# Patient Record
Sex: MALE | Race: WHITE | NOT HISPANIC OR LATINO | URBAN - METROPOLITAN AREA
[De-identification: names, ages, dates, MRNs, and addresses within clinical notes are randomized per-mention and may not be internally consistent; named-entity substitution may affect disease eponyms.]

---

## 2019-11-02 ENCOUNTER — INPATIENT (INPATIENT)
Facility: HOSPITAL | Age: 69
LOS: 1 days | Discharge: ANOTHER HOSPITAL | DRG: 303 | End: 2019-11-04
Attending: INTERNAL MEDICINE | Admitting: FAMILY MEDICINE
Payer: MEDICARE

## 2019-11-02 VITALS
RESPIRATION RATE: 20 BRPM | DIASTOLIC BLOOD PRESSURE: 121 MMHG | SYSTOLIC BLOOD PRESSURE: 218 MMHG | WEIGHT: 167.99 LBS | HEART RATE: 101 BPM | OXYGEN SATURATION: 97 % | TEMPERATURE: 98 F

## 2019-11-02 DIAGNOSIS — Z98.890 OTHER SPECIFIED POSTPROCEDURAL STATES: Chronic | ICD-10-CM

## 2019-11-02 LAB
APTT BLD: 28.6 SEC — SIGNIFICANT CHANGE UP (ref 28.5–37)
BASOPHILS # BLD AUTO: 0.02 K/UL — SIGNIFICANT CHANGE UP (ref 0–0.2)
BASOPHILS NFR BLD AUTO: 0.3 % — SIGNIFICANT CHANGE UP (ref 0–2)
CK SERPL-CCNC: 502 U/L — HIGH (ref 26–308)
EOSINOPHIL # BLD AUTO: 0.19 K/UL — SIGNIFICANT CHANGE UP (ref 0–0.5)
EOSINOPHIL NFR BLD AUTO: 2.5 % — SIGNIFICANT CHANGE UP (ref 0–6)
HCT VFR BLD CALC: 46.6 % — SIGNIFICANT CHANGE UP (ref 39–50)
HGB BLD-MCNC: 15.6 G/DL — SIGNIFICANT CHANGE UP (ref 13–17)
IMM GRANULOCYTES NFR BLD AUTO: 0.4 % — SIGNIFICANT CHANGE UP (ref 0–1.5)
INR BLD: 0.97 RATIO — SIGNIFICANT CHANGE UP (ref 0.88–1.16)
LYMPHOCYTES # BLD AUTO: 1.91 K/UL — SIGNIFICANT CHANGE UP (ref 1–3.3)
LYMPHOCYTES # BLD AUTO: 25.6 % — SIGNIFICANT CHANGE UP (ref 13–44)
MCHC RBC-ENTMCNC: 30.3 PG — SIGNIFICANT CHANGE UP (ref 27–34)
MCHC RBC-ENTMCNC: 33.5 GM/DL — SIGNIFICANT CHANGE UP (ref 32–36)
MCV RBC AUTO: 90.5 FL — SIGNIFICANT CHANGE UP (ref 80–100)
MONOCYTES # BLD AUTO: 0.61 K/UL — SIGNIFICANT CHANGE UP (ref 0–0.9)
MONOCYTES NFR BLD AUTO: 8.2 % — SIGNIFICANT CHANGE UP (ref 2–14)
NEUTROPHILS # BLD AUTO: 4.71 K/UL — SIGNIFICANT CHANGE UP (ref 1.8–7.4)
NEUTROPHILS NFR BLD AUTO: 63 % — SIGNIFICANT CHANGE UP (ref 43–77)
NRBC # BLD: 0 /100 WBCS — SIGNIFICANT CHANGE UP (ref 0–0)
PLATELET # BLD AUTO: 212 K/UL — SIGNIFICANT CHANGE UP (ref 150–400)
PROTHROM AB SERPL-ACNC: 10.9 SEC — SIGNIFICANT CHANGE UP (ref 10–12.9)
RBC # BLD: 5.15 M/UL — SIGNIFICANT CHANGE UP (ref 4.2–5.8)
RBC # FLD: 12.6 % — SIGNIFICANT CHANGE UP (ref 10.3–14.5)
TROPONIN I SERPL-MCNC: <.015 NG/ML — SIGNIFICANT CHANGE UP (ref 0.01–0.04)
WBC # BLD: 7.47 K/UL — SIGNIFICANT CHANGE UP (ref 3.8–10.5)
WBC # FLD AUTO: 7.47 K/UL — SIGNIFICANT CHANGE UP (ref 3.8–10.5)

## 2019-11-02 PROCEDURE — 71275 CT ANGIOGRAPHY CHEST: CPT | Mod: 26

## 2019-11-02 PROCEDURE — 93010 ELECTROCARDIOGRAM REPORT: CPT

## 2019-11-02 PROCEDURE — 71046 X-RAY EXAM CHEST 2 VIEWS: CPT | Mod: 26

## 2019-11-02 RX ORDER — SODIUM CHLORIDE 9 MG/ML
1000 INJECTION INTRAMUSCULAR; INTRAVENOUS; SUBCUTANEOUS ONCE
Refills: 0 | Status: COMPLETED | OUTPATIENT
Start: 2019-11-02 | End: 2019-11-02

## 2019-11-02 RX ADMIN — Medication 1 MILLIGRAM(S): at 23:50

## 2019-11-02 RX ADMIN — SODIUM CHLORIDE 1000 MILLILITER(S): 9 INJECTION INTRAMUSCULAR; INTRAVENOUS; SUBCUTANEOUS at 21:21

## 2019-11-02 NOTE — ED PROVIDER NOTE - OBJECTIVE STATEMENT
pt bib ems for palp and discomfort to chest tonight. pt reports symptoms now improved. no fevers, chills, ha, d/n/v, sob, cough, abd pain, edema, calf pain, travel.  pmd - joann muniz - jolantar

## 2019-11-02 NOTE — ED ADULT NURSE NOTE - OBJECTIVE STATEMENT
Patient alert and oriented X 3. Complaining of intermittent palpitations that started 45 minutes ago. No palpitations at this time. Denies chest pain, shortness of breath, nausea, vomiting, headache, dizziness and fever. Lungs clears bilaterally. Respirations even and not labored. Abdomen soft non tender.

## 2019-11-02 NOTE — ED ADULT NURSE NOTE - CHPI ED NUR SYMPTOMS NEG
no chest pain/no fever/no congestion/no syncope/no vomiting/no shortness of breath/no nausea/no back pain/no chills/no dizziness/no diaphoresis

## 2019-11-03 DIAGNOSIS — I10 ESSENTIAL (PRIMARY) HYPERTENSION: ICD-10-CM

## 2019-11-03 DIAGNOSIS — Z29.9 ENCOUNTER FOR PROPHYLACTIC MEASURES, UNSPECIFIED: ICD-10-CM

## 2019-11-03 DIAGNOSIS — I20.0 UNSTABLE ANGINA: ICD-10-CM

## 2019-11-03 DIAGNOSIS — E78.5 HYPERLIPIDEMIA, UNSPECIFIED: ICD-10-CM

## 2019-11-03 DIAGNOSIS — R07.9 CHEST PAIN, UNSPECIFIED: ICD-10-CM

## 2019-11-03 LAB
CK MB BLD-MCNC: 2 % — SIGNIFICANT CHANGE UP (ref 0–3.5)
CK MB CFR SERPL CALC: 5.5 NG/ML — HIGH (ref 0–3.6)
CK SERPL-CCNC: 270 U/L — SIGNIFICANT CHANGE UP (ref 26–308)
TROPONIN I SERPL-MCNC: 0.04 NG/ML — SIGNIFICANT CHANGE UP (ref 0.01–0.04)
TROPONIN I SERPL-MCNC: <.015 NG/ML — SIGNIFICANT CHANGE UP (ref 0.01–0.04)

## 2019-11-03 PROCEDURE — 99223 1ST HOSP IP/OBS HIGH 75: CPT

## 2019-11-03 PROCEDURE — 99221 1ST HOSP IP/OBS SF/LOW 40: CPT | Mod: GC,AI

## 2019-11-03 PROCEDURE — 99285 EMERGENCY DEPT VISIT HI MDM: CPT

## 2019-11-03 RX ORDER — ATORVASTATIN CALCIUM 80 MG/1
40 TABLET, FILM COATED ORAL AT BEDTIME
Refills: 0 | Status: DISCONTINUED | OUTPATIENT
Start: 2019-11-03 | End: 2019-11-04

## 2019-11-03 RX ORDER — LISINOPRIL 2.5 MG/1
10 TABLET ORAL DAILY
Refills: 0 | Status: DISCONTINUED | OUTPATIENT
Start: 2019-11-03 | End: 2019-11-04

## 2019-11-03 RX ORDER — ATORVASTATIN CALCIUM 80 MG/1
40 TABLET, FILM COATED ORAL DAILY
Refills: 0 | Status: DISCONTINUED | OUTPATIENT
Start: 2019-11-03 | End: 2019-11-03

## 2019-11-03 RX ORDER — ASPIRIN/CALCIUM CARB/MAGNESIUM 324 MG
325 TABLET ORAL DAILY
Refills: 0 | Status: DISCONTINUED | OUTPATIENT
Start: 2019-11-03 | End: 2019-11-04

## 2019-11-03 RX ORDER — ENOXAPARIN SODIUM 100 MG/ML
80 INJECTION SUBCUTANEOUS
Refills: 0 | Status: DISCONTINUED | OUTPATIENT
Start: 2019-11-03 | End: 2019-11-04

## 2019-11-03 RX ORDER — CLOPIDOGREL BISULFATE 75 MG/1
75 TABLET, FILM COATED ORAL DAILY
Refills: 0 | Status: DISCONTINUED | OUTPATIENT
Start: 2019-11-03 | End: 2019-11-04

## 2019-11-03 RX ORDER — CHOLECALCIFEROL (VITAMIN D3) 125 MCG
1000 CAPSULE ORAL DAILY
Refills: 0 | Status: DISCONTINUED | OUTPATIENT
Start: 2019-11-03 | End: 2019-11-04

## 2019-11-03 RX ADMIN — Medication 1000 UNIT(S): at 11:14

## 2019-11-03 RX ADMIN — ATORVASTATIN CALCIUM 40 MILLIGRAM(S): 80 TABLET, FILM COATED ORAL at 21:42

## 2019-11-03 RX ADMIN — CLOPIDOGREL BISULFATE 75 MILLIGRAM(S): 75 TABLET, FILM COATED ORAL at 11:14

## 2019-11-03 RX ADMIN — ENOXAPARIN SODIUM 80 MILLIGRAM(S): 100 INJECTION SUBCUTANEOUS at 18:44

## 2019-11-03 RX ADMIN — Medication 325 MILLIGRAM(S): at 11:14

## 2019-11-03 NOTE — ED ADULT NURSE REASSESSMENT NOTE - COMFORT CARE
po fluids offered/repositioned/wait time explained/side rails up/warm blanket provided/meal provided
plan of care explained

## 2019-11-03 NOTE — H&P ADULT - NSHPPHYSICALEXAM_GEN_ALL_CORE
Physical Exam:  General: NAD  Neck: Some neck discomfort when turning neck side to side.  HEENT: NCAT, PERRLA, EOMI bl, moist mucous membranes   Neurology: A&Ox3, nonfocal, CN II-XII grossly intact, sensation intact   Respiratory: CTA B/L, No W/R/R  CV: RRR, +S1/S2, no murmurs, rubs or gallops  Abdominal: Soft, NT, ND +BSx4, no palpable masses  Extremities: No C/C/E, + peripheral pulses  MSK: Normal ROM, no joint erythema or warmth, no joint swelling   Heme: No palpable supraclavicular nodules, no obvious ecchymosis or petechiae   Skin: warm, dry, normal color

## 2019-11-03 NOTE — CONSULT NOTE ADULT - SUBJECTIVE AND OBJECTIVE BOX
[facility]         Adriana Be, Heidi, Inga, Keegan, Rubin, Aracelis        [phone] (office)    CHIEF COMPLAINT: Patient is a 69y old  Male who presents with a chief complaint of palpitations    HPI:  69m htn, hyperlipidemia, known cad s/p mi and pci in 2005.  Is seen by Dr. Moreno in [place]. Has been doing well from a cv perspective, without significant sxs of angina, heart failure or arrhythmia.  For several months he has been experiencing discomfort in his throat provoked by walking or other activity, more so in colder weather.  The sxs may be prolonged and last >30 minutes.  They have not occurred at rest.  They have not been associated with discomfort in any other location or dyspnea.    Last night he noted the onset of palpitations at rest associated with this throat discomfort.  Symptoms lasted >30 min.  He was still having the sxs in the er, at which time his heart rate was 100.  His ekg was not acutely ischemic.  His first set of ce was normal with an increase in the troponin for the second set, which remains in the normal range.  A cta was performed given sxs and elevated ddimer which was negative for pe.  coronary calcification was noted.  Consultation is now being obtained.    PAST MEDICAL & SURGICAL HISTORY:  Stented coronary artery  H/O hernia repair      SOCIAL HISTORY: No active tobacco, alcohol or illicit drug use    FAMILY HISTORY:   No pertinent family history of CAD    Outpatient medications: asa 81, plavix 75, atorava, quinapril    MEDICATIONS  (STANDING):    MEDICATIONS  (PRN):      Allergies    No Known Allergies    Intolerances        REVIEW OF SYSTEMS: Is negative for eye, ENT, GI, , allergic, dermatologic, musculoskeletal and neurologic, except as described above.    VITAL SIGNS:   Vital Signs Last 24 Hrs  T(C): 36.7 (03 Nov 2019 03:17), Max: 36.9 (02 Nov 2019 22:10)  T(F): 98 (03 Nov 2019 03:17), Max: 98.4 (02 Nov 2019 22:10)  HR: 50 (03 Nov 2019 03:17) (50 - 101)  BP: 155/73 (03 Nov 2019 03:17) (155/73 - 218/121)  BP(mean): --  RR: 14 (03 Nov 2019 03:17) (14 - 20)  SpO2: 96% (03 Nov 2019 03:17) (95% - 97%)    I&O's Summary      PHYSICAL EXAM:    Constitutional: NAD, awake and alert, well-developed  Eyes:  EOMI, no oral cyanosis, conjunctivae clear, anicteric.  Pulmonary: Non-labored, breath sounds are clear bilaterally, no wheezing, rales or rhonchi  Cardiovascular:  regular S1 and S2. No murmur.  No rubs, gallops or clicks  Gastrointestinal: Bowel Sounds present, soft, nontender.   Lymph: No peripheral edema.   Neurological: Alert, strength and sensitivity are grossly intact  Skin: No obvious lesions/rashes.   Psych:  Mood & affect appropriate .    LABS: All Labs Reviewed:                        15.6   7.47  )-----------( 212      ( 02 Nov 2019 22:37 )             46.6     02 Nov 2019 22:37    144    |  110    |  21     ----------------------------<  141    3.5     |  26     |  0.99     Ca    9.2        02 Nov 2019 22:37    TPro  7.4    /  Alb  4.2    /  TBili  0.4    /  DBili  x      /  AST  28     /  ALT  32     /  AlkPhos  112    02 Nov 2019 22:37    PT/INR - ( 02 Nov 2019 22:37 )   PT: 10.9 sec;   INR: 0.97 ratio         PTT - ( 02 Nov 2019 22:37 )  PTT:28.6 sec  CARDIAC MARKERS ( 03 Nov 2019 05:45 )  .036 ng/mL / x     / x     / x     / x      CARDIAC MARKERS ( 02 Nov 2019 22:37 )  <.015 ng/mL / x     / 502 U/L / x     / x          Blood Culture:         RADIOLOGY:  < from: CT Angio Chest w/ IV Cont (11.02.19 @ 23:38) >    EXAM:  CT ANGIO CHEST (W)AW IC                            PROCEDURE DATE:  11/02/2019          INTERPRETATION:  CLINICAL INFORMATION: Palpitations, tachycardia, chest   discomfort, elevated d-dimer.    COMPARISON: None.    PROCEDURE:   CT Angiography of the Chest.  80 ml of Omnipaque 350 was injected intravenously. 20 ml were discarded.  Sagittal and coronal reformats were performed as well as 3D (MIP)   reconstructions.      FINDINGS:    LUNGS AND AIRWAYS: Patent central airways. 4 mm nodule in the right   middle lobe (series 4:261). No suspicious lung consolidation, nodule, or   mass. Mild centrilobular emphysema. Mild bibasilar dependent atelectasis.    PLEURA: No pleural effusion.    MEDIASTINUM AND ABDELRAHMAN: No lymphadenopathy.    VESSELS:Adequate pulmonary arterial opacification. No pulmonary   embolism. Normal caliber thoracic aorta and main pulmonary artery.   Atherosclerotic calcifications of the thoracic aorta and coronary   arteries.    HEART: Heart size is normal. No pericardial effusion.    CHEST WALL AND LOWER NECK: Within normal limits.    VISUALIZED UPPER ABDOMEN: Cholelithiasis. Subcentimeter low-attenuation   lesion in the right kidney that is too small to characterize.    BONES: Mild degenerative changes of the spine.    IMPRESSION:     No pulmonary embolism. No acute parenchymal or pleural lung disease.                JADA OLIVIA D.O. ATTENDING RADIOLOGIST  This document has been electronically signed. Nov  3 2019 12:35AM                < end of copied text >    EKG:

## 2019-11-03 NOTE — CONSULT NOTE ADULT - ASSESSMENT
69m htn, hyperlipidemia, known cad s/p mi and pci in 2005.  Is seen by Dr. Shepard in [place]. Has been doing well from a cv perspective, without significant sxs of angina, heart failure or arrhythmia.  For several months he has been experiencing discomfort in his throat provoked by walking or other activity, more so in colder weather.  The sxs may be prolonged and last >30 minutes.  They have not occurred at rest.  They have not been associated with discomfort in any other location or dyspnea.    Last night he noted the onset of palpitations at rest associated with this throat discomfort.  Symptoms lasted >30 min.  He was still having the sxs in the er, at which time his heart rate was 100.  His ekg was not acutely ischemic.  His first set of ce was normal with an increase in the troponin for the second set, which remains in the normal range.  A cta was performed given sxs and elevated ddimer which was negative for pe.  coronary calcification was noted.  Consultation is now being obtained.    -known cad s/p remote pci  -has been on dapt and statin  -his hx is consistent with 3-4 mos of angina (throat discomfort) with activity, leida in colder weather, a classic description  -sxs last night in the setting of palpitations  -whether sxs provoked by tachycardia or not, his sxs of angina with activity, and last night at rest is concerning  -would cont dapt and statin and add lovenox, and plan to tx for cath tomorrow. sees dr shepard who reportedly goes to [facility]. we will contact him tomorrow    -palpitations of uncertain etiology  -with ongoing sxs in er he had a sinus tach at 100  -unclear if the sx was from a significant arrhythmia or not  -tele    -no vol ol

## 2019-11-03 NOTE — H&P ADULT - HISTORY OF PRESENT ILLNESS
ED vitals: afebrile, BP: 218/121 then 180/51, HR: 101 then 50, 96 on RA  Labs significant for: Ddimer: 241, CK: 502    CT angio chest: No pulmonary embolism. No acute parenchymal or pleural lung disease   CXR: No active disease    EKG:   In the ED patient was given NS IVF bolus X1, ativan 1 mg X1 68 y/o M with a PMHx of HTN, HLD, CAD s/p MI and PCI in 2005 presented to the ED c/o palpitations' with associated throat discomfort. Yesterday patient was cleaning his yard for 5 hours total, when after he developed palpitations at rest, and new onset throat discomfort that did not go away. Symptoms lessened within an hour. Patient has been experiencing chest discomfort with throat pain for the last few months when doing activity, but they did not last as long as last nights episode. Denies current chest pain, N/V/D, SOB, abdominal pain    ED vitals: afebrile, BP: 218/121 then 180/51, HR: 101 then 50, 96 on RA  Labs significant for: Ddimer: 241, CK: 502    CT angio chest: No pulmonary embolism. No acute parenchymal or pleural lung disease   CXR: No active disease    EKG: NSR, 100 bpm  In the ED patient was given NS IVF bolus X1, ativan 1 mg X1

## 2019-11-03 NOTE — H&P ADULT - NSHPREVIEWOFSYSTEMS_GEN_ALL_CORE
Constitutional: denies fever, chills  HEENT: denies blurry vision, difficulty hearing  Respiratory: denies SOB, JETER, cough, sputum production, wheezing  Cardiovascular: Admits to chest discomfort and palpitations last night with activity, denies current chest discomfort and palpitations, LE edema  Gastrointestinal: denies nausea, vomiting, diarrhea, constipation, abdominal pain, melena, hematochezia   Genitourinary: denies dysuria, frequency, urgency, hematuria   Skin: Denies rashes, itching  Musculoskeletal: denies myalgias, joint swelling, muscle weakness  Neurologic: denies headache, weakness, dizziness, numbness/tingling  Psych: Denies changes in mood  ROS negative except as noted above

## 2019-11-03 NOTE — ED ADULT NURSE REASSESSMENT NOTE - NS ED NURSE REASSESS COMMENT FT1
Patient returned from CT complaining of chills, and pain to left side of throat. Dr. Benítez made aware and at the bedside. No rash, hives, difficulty breathing at this time. Patient medicated per order. Repeat EKG completed. Ahsan LUTZ
Patient states " I fell better now. " after receiving 1 mg ativan. Respirations even and not labored. NSR noted on cardiac monitor. Dr. Benítez made aware. Patient denies throat pain a this time. Pending CT result. Soraya LUTZ

## 2019-11-03 NOTE — H&P ADULT - PROBLEM SELECTOR PLAN 4
BB IMPROVE VTE Individual Risk Assessment          RISK                                                          Points    [  ] Previous VTE                                                3  [  ] Thrombophilia                                             2  [  ] Lower limb paralysis                                   2        (unable to hold up >15 seconds)    [  ] Current Cancer                                            2         (within 6 months)  [  ] Immobilization > 24 hrs                              1  [  ] ICU/CCU stay > 24 hours                            1  [  ] Age > 60                                                    1    IMPROVE VTE Score _________ Full dose Lovenox    IMPROVE VTE Individual Risk Assessment          RISK                                                          Points    [  ] Previous VTE                                                3  [  ] Thrombophilia                                             2  [  ] Lower limb paralysis                                   2        (unable to hold up >15 seconds)    [  ] Current Cancer                                            2         (within 6 months)  [  ] Immobilization > 24 hrs                              1  [  ] ICU/CCU stay > 24 hours                            1  [ x ] Age > 60                                                    1    IMPROVE VTE Score 1

## 2019-11-03 NOTE — H&P ADULT - ASSESSMENT
INCOMPLETE 70 y/o M with a PMHx of HTN, HLD, CAD s/p MI and PCI in 2005 presented to the ED c/o palpitations' with associated throat discomfort.

## 2019-11-03 NOTE — H&P ADULT - PROBLEM SELECTOR PLAN 1
-first troponin wnl, increase in second troponin but within normal limits, follow third set of CE   -Continue home statin, ASA, Plavix   -  -Ddimer elevated, CTA done in the ED negative for PE  -Cardio consult Dr. Xiong evaluated patient in the ED, recs appreciated Known CAD with PCI history   - Several months of angina with associated throat discomfort and palpitations.  - Now with a angina after activity that lasted >30 minutes.  - first troponin wnl, increase in second troponin but within normal limits, follow third set of CE   -Continue home statin, ASA, Plavix   -Ddimer elevated, CTA done in the ED negative for PE  -Cardio consult Dr. Xiong evaluated patient in the ED, recs appreciated Known CAD with PCI history   - Several months of angina with associated throat discomfort and palpitations.  - Now with a angina after activity that lasted >30 minutes.  - first troponin wnl, increase in second troponin but within normal limits, follow third set of CE   -Continue home statin, ASA, Plavix   -Ddimer elevated, CTA done in the ED negative for PE  -Cardio consult Dr. Xiong evaluated patient in the ED, recs appreciated  - Transfer to [place] for cardiac catheter

## 2019-11-04 ENCOUNTER — TRANSCRIPTION ENCOUNTER (OUTPATIENT)
Age: 69
End: 2019-11-04

## 2019-11-04 VITALS — HEART RATE: 58 BPM

## 2019-11-04 LAB
ANION GAP SERPL CALC-SCNC: 6 MMOL/L — SIGNIFICANT CHANGE UP (ref 5–17)
BUN SERPL-MCNC: 17 MG/DL — SIGNIFICANT CHANGE UP (ref 7–23)
CALCIUM SERPL-MCNC: 8.5 MG/DL — SIGNIFICANT CHANGE UP (ref 8.5–10.1)
CHLORIDE SERPL-SCNC: 109 MMOL/L — HIGH (ref 96–108)
CK MB BLD-MCNC: 1.8 % — SIGNIFICANT CHANGE UP (ref 0–3.5)
CK MB CFR SERPL CALC: 3.2 NG/ML — SIGNIFICANT CHANGE UP (ref 0–3.6)
CK SERPL-CCNC: 179 U/L — SIGNIFICANT CHANGE UP (ref 26–308)
CO2 SERPL-SCNC: 27 MMOL/L — SIGNIFICANT CHANGE UP (ref 22–31)
CREAT SERPL-MCNC: 0.85 MG/DL — SIGNIFICANT CHANGE UP (ref 0.5–1.3)
GLUCOSE SERPL-MCNC: 116 MG/DL — HIGH (ref 70–99)
HCT VFR BLD CALC: 47.1 % — SIGNIFICANT CHANGE UP (ref 39–50)
HCV AB S/CO SERPL IA: 0.16 S/CO — SIGNIFICANT CHANGE UP (ref 0–0.99)
HCV AB SERPL-IMP: SIGNIFICANT CHANGE UP
HGB BLD-MCNC: 15.5 G/DL — SIGNIFICANT CHANGE UP (ref 13–17)
MCHC RBC-ENTMCNC: 29.9 PG — SIGNIFICANT CHANGE UP (ref 27–34)
MCHC RBC-ENTMCNC: 32.9 GM/DL — SIGNIFICANT CHANGE UP (ref 32–36)
MCV RBC AUTO: 90.8 FL — SIGNIFICANT CHANGE UP (ref 80–100)
NRBC # BLD: 0 /100 WBCS — SIGNIFICANT CHANGE UP (ref 0–0)
PLATELET # BLD AUTO: 202 K/UL — SIGNIFICANT CHANGE UP (ref 150–400)
POTASSIUM SERPL-MCNC: 4.2 MMOL/L — SIGNIFICANT CHANGE UP (ref 3.5–5.3)
POTASSIUM SERPL-SCNC: 4.2 MMOL/L — SIGNIFICANT CHANGE UP (ref 3.5–5.3)
RBC # BLD: 5.19 M/UL — SIGNIFICANT CHANGE UP (ref 4.2–5.8)
RBC # FLD: 12.7 % — SIGNIFICANT CHANGE UP (ref 10.3–14.5)
SODIUM SERPL-SCNC: 142 MMOL/L — SIGNIFICANT CHANGE UP (ref 135–145)
TROPONIN I SERPL-MCNC: <.015 NG/ML — SIGNIFICANT CHANGE UP (ref 0.01–0.04)
WBC # BLD: 6.05 K/UL — SIGNIFICANT CHANGE UP (ref 3.8–10.5)
WBC # FLD AUTO: 6.05 K/UL — SIGNIFICANT CHANGE UP (ref 3.8–10.5)

## 2019-11-04 PROCEDURE — 99239 HOSP IP/OBS DSCHRG MGMT >30: CPT

## 2019-11-04 PROCEDURE — 99233 SBSQ HOSP IP/OBS HIGH 50: CPT

## 2019-11-04 PROCEDURE — 93010 ELECTROCARDIOGRAM REPORT: CPT

## 2019-11-04 RX ORDER — ASPIRIN/CALCIUM CARB/MAGNESIUM 324 MG
1 TABLET ORAL
Qty: 0 | Refills: 0 | DISCHARGE
Start: 2019-11-04

## 2019-11-04 RX ORDER — CHOLECALCIFEROL (VITAMIN D3) 125 MCG
1 CAPSULE ORAL
Qty: 0 | Refills: 0 | DISCHARGE

## 2019-11-04 RX ORDER — ENOXAPARIN SODIUM 100 MG/ML
80 INJECTION SUBCUTANEOUS
Qty: 0 | Refills: 0 | DISCHARGE
Start: 2019-11-04

## 2019-11-04 RX ORDER — ATORVASTATIN CALCIUM 80 MG/1
0 TABLET, FILM COATED ORAL
Qty: 0 | Refills: 0 | DISCHARGE

## 2019-11-04 RX ORDER — ASPIRIN/CALCIUM CARB/MAGNESIUM 324 MG
0 TABLET ORAL
Qty: 0 | Refills: 0 | DISCHARGE

## 2019-11-04 RX ORDER — QUINAPRIL HYDROCHLORIDE 40 MG/1
0 TABLET, FILM COATED ORAL
Qty: 0 | Refills: 0 | DISCHARGE

## 2019-11-04 RX ORDER — ATORVASTATIN CALCIUM 80 MG/1
1 TABLET, FILM COATED ORAL
Qty: 0 | Refills: 0 | DISCHARGE

## 2019-11-04 RX ORDER — ASPIRIN/CALCIUM CARB/MAGNESIUM 324 MG
1 TABLET ORAL
Qty: 0 | Refills: 0 | DISCHARGE

## 2019-11-04 RX ORDER — QUINAPRIL HYDROCHLORIDE 40 MG/1
1 TABLET, FILM COATED ORAL
Qty: 0 | Refills: 0 | DISCHARGE

## 2019-11-04 RX ORDER — CLOPIDOGREL BISULFATE 75 MG/1
0 TABLET, FILM COATED ORAL
Qty: 0 | Refills: 0 | DISCHARGE

## 2019-11-04 RX ORDER — CLOPIDOGREL BISULFATE 75 MG/1
1 TABLET, FILM COATED ORAL
Qty: 0 | Refills: 0 | DISCHARGE

## 2019-11-04 RX ORDER — ACETAMINOPHEN 500 MG
650 TABLET ORAL ONCE
Refills: 0 | Status: COMPLETED | OUTPATIENT
Start: 2019-11-04 | End: 2019-11-04

## 2019-11-04 RX ADMIN — CLOPIDOGREL BISULFATE 75 MILLIGRAM(S): 75 TABLET, FILM COATED ORAL at 11:39

## 2019-11-04 RX ADMIN — Medication 1000 UNIT(S): at 11:39

## 2019-11-04 RX ADMIN — Medication 650 MILLIGRAM(S): at 06:45

## 2019-11-04 RX ADMIN — Medication 325 MILLIGRAM(S): at 11:39

## 2019-11-04 RX ADMIN — Medication 650 MILLIGRAM(S): at 06:05

## 2019-11-04 RX ADMIN — ENOXAPARIN SODIUM 80 MILLIGRAM(S): 100 INJECTION SUBCUTANEOUS at 05:18

## 2019-11-04 RX ADMIN — LISINOPRIL 10 MILLIGRAM(S): 2.5 TABLET ORAL at 05:18

## 2019-11-04 NOTE — PROGRESS NOTE ADULT - ATTENDING COMMENTS
I saw and examined the patient personally. Spoke with above provider regarding this case. I reviewed the above findings completely.  I agree with the above history, physical, and plan which I have edited where appropriate.    I tried to reach Dr. Moreno and left  for callback on his answering service. I spoke with Dr. Prasad at [facility] who will accept pt for left heart cath

## 2019-11-04 NOTE — CHART NOTE - NSCHARTNOTEFT_GEN_A_CORE
RN called resident as patient reporting headache and chest pain. Patient seen and evaluated at bedside by resident. Patient reports "tight" pain diffusely over scalp, describes the pain as mild. Denies any visual or hearing changes. Patient also reporting left-sided non-radiating chest pain that came on suddenly with headache. Will not rate pain on 10-point scale, but describes pain as stabbing and mild. Pain worsened with deep inspiration. States he has never felt chest pain similar to this episode. Patient also reporting feeling cold. Denies shortness of breath, palpitations, N/V, throat pain, or abdominal pain.    T(C): 37 (11-04-19 @ 04:56), Max: 37 (11-04-19 @ 04:56)  HR: 63 (11-04-19 @ 04:56) (50 - 63)  BP: 180/100 (11-04-19 @ 04:56) (145/72 - 180/100)  RR: 16 (11-04-19 @ 04:56) (14 - 18)  SpO2: 96% (11-04-19 @ 04:56) (95% - 98%)    GENERAL: patient in bed, appears to be in mild distress   EYES: sclera clear, no exudates  ENMT: moist mucous membranes  NECK: supple, soft  LUNGS: clear to auscultation, no wheezing or rhonchi appreciated  HEART: S1/S2, regular rate and rhythm, no murmurs noted, no lower extremity edema  GASTROINTESTINAL: abdomen is soft, nontender, nondistended, normoactive bowel sounds  INTEGUMENT: good skin turgor  MUSCULOSKELETAL: no obvious deformity  NEUROLOGIC: awake, alert, oriented x3, good muscle tone in 4 extremities, no obvious sensory deficits, CN II-XII grossly intact    Assessment/Plan: 68 y/o M with a PMHx of HTN, HLD, CAD s/p MI and PCI in 2005 presented to the ED c/o palpitations' with associated throat discomfort most likely 2/2 unstable angina. Now with headache and left-sided chest pain. Plan for patient transfer today for cardiac cath.  - Patient currently in NSR in 60s on telemetry   - Stat EKG: NSR @ 64bpm, appears unchanged from prior EKG on 11/3/19  - F/u stat cardiac enzymes--previous 3 sets wnl   - No signs of volume overload on exam  - Acetaminophen 650mg PO x1 for headache  - BP elevated at 180/100, pt due for lisinopril--RN told to give lisinopril now  - Plan discussed with RN  - Plan discussed with senior resident    Shayy Steele MD, PGY-1 RN called resident as patient reporting headache and chest pain. Patient seen and evaluated at bedside by resident. Patient reports "tight" pain diffusely over scalp, describes the pain as mild. Denies any visual or hearing changes. Patient also reporting left-sided non-radiating chest pain that came on suddenly with headache. Will not rate pain on 10-point scale, but describes pain as stabbing and mild. Pain worsened with deep inspiration. States he has never felt chest pain similar to this episode. Patient also reporting feeling cold. Denies shortness of breath, palpitations, N/V, throat pain, or abdominal pain.    T(C): 37 (11-04-19 @ 04:56), Max: 37 (11-04-19 @ 04:56)  HR: 63 (11-04-19 @ 04:56) (50 - 63)  BP: 180/100 (11-04-19 @ 04:56) (145/72 - 180/100)  RR: 16 (11-04-19 @ 04:56) (14 - 18)  SpO2: 96% (11-04-19 @ 04:56) (95% - 98%)    GENERAL: patient in bed, appears to be in mild distress   EYES: sclera clear, no exudates  ENMT: moist mucous membranes  NECK: supple, soft  LUNGS: clear to auscultation, no wheezing or rhonchi appreciated  HEART: S1/S2, regular rate and rhythm, no murmurs noted, no lower extremity edema  GASTROINTESTINAL: abdomen is soft, nontender, nondistended, normoactive bowel sounds  INTEGUMENT: good skin turgor  MUSCULOSKELETAL: no obvious deformity  NEUROLOGIC: awake, alert, oriented x3, good muscle tone in 4 extremities, no obvious sensory deficits, CN II-XII grossly intact    Assessment/Plan: 70 y/o M with a PMHx of HTN, HLD, CAD s/p MI and PCI in 2005 presented to the ED c/o palpitations' with associated throat discomfort most likely 2/2 unstable angina. Now with headache and left-sided chest pain. Plan for patient transfer today for cardiac cath.  - Patient currently in NSR in 60s on telemetry   - Stat EKG: NSR @ 64bpm, appears unchanged from prior EKG on 11/3/19  - Stat cardiac enzymes wnl  - No signs of volume overload on exam  - Acetaminophen 650mg PO x1 for headache  - BP elevated at 180/100, pt due for lisinopril--RN told to give lisinopril now  - Plan discussed with RN  - Plan discussed with senior resident    Shayy Steele MD, PGY-1

## 2019-11-04 NOTE — PROGRESS NOTE ADULT - SUBJECTIVE AND OBJECTIVE BOX
[facility] -- Adriana Be, Heidi, Inga, Keegan, Aracelis Conklin  Office # [phone]      Follow Up:  chest pain    Subjective/Observations:   Seen at bedside, reports this am 0500 had left sided chest pain radiating to left side neck now resolved,  denies associated symptoms of dizziness diaphoresis nausea  currently with no complaints    REVIEW OF SYSTEMS: All other review of systems is negative unless indicated above    PAST MEDICAL & SURGICAL HISTORY:  Stented coronary artery  H/O hernia repair      MEDICATIONS  (STANDING):  aspirin 325 milliGRAM(s) Oral daily  atorvastatin 40 milliGRAM(s) Oral at bedtime  cholecalciferol 1000 Unit(s) Oral daily  clopidogrel Tablet 75 milliGRAM(s) Oral daily  enoxaparin Injectable 80 milliGRAM(s) SubCutaneous two times a day  lisinopril 10 milliGRAM(s) Oral daily    MEDICATIONS  (PRN):      Allergies    Novocain (Other)    Intolerances        Vital Signs Last 24 Hrs  T(C): 36.8 (04 Nov 2019 07:58), Max: 37 (04 Nov 2019 04:56)  T(F): 98.2 (04 Nov 2019 07:58), Max: 98.6 (04 Nov 2019 04:56)  HR: 53 (04 Nov 2019 07:58) (50 - 63)  BP: 137/81 (04 Nov 2019 07:58) (137/81 - 180/100)  BP(mean): --  RR: 16 (04 Nov 2019 07:58) (15 - 18)  SpO2: 95% (04 Nov 2019 07:58) (95% - 98%)    I&O's Summary        PHYSICAL EXAM:  TELE: sb 50bpm  Constitutional: NAD, awake and alert, well-developed  HEENT: Moist Mucous Membranes, Anicteric  Pulmonary: Non-labored, breath sounds are clear bilaterally, No wheezing, crackles or rhonchi  Cardiovascular: Regular, S1 and S2 nl, No murmurs, rubs, gallops or clicks  Gastrointestinal: Bowel Sounds present, soft, nontender.   Lymph: No lymphadenopathy. No peripheral edema.  Skin: No visible rashes or ulcers.  Psych:  Mood & affect appropriate    LABS: All Labs Reviewed:                        15.5   6.05  )-----------( 202      ( 04 Nov 2019 05:42 )             47.1                         15.6   7.47  )-----------( 212      ( 02 Nov 2019 22:37 )             46.6     04 Nov 2019 05:42    142    |  109    |  17     ----------------------------<  116    4.2     |  27     |  0.85   02 Nov 2019 22:37    144    |  110    |  21     ----------------------------<  141    3.5     |  26     |  0.99     Ca    8.5        04 Nov 2019 05:42  Ca    9.2        02 Nov 2019 22:37    TPro  7.4    /  Alb  4.2    /  TBili  0.4    /  DBili  x      /  AST  28     /  ALT  32     /  AlkPhos  112    02 Nov 2019 22:37    PT/INR - ( 02 Nov 2019 22:37 )   PT: 10.9 sec;   INR: 0.97 ratio         PTT - ( 02 Nov 2019 22:37 )  PTT:28.6 sec  CARDIAC MARKERS ( 04 Nov 2019 05:42 )  <.015 ng/mL / x     / 179 U/L / x     / 3.2 ng/mL  CARDIAC MARKERS ( 03 Nov 2019 11:47 )  <.015 ng/mL / x     / 270 U/L / x     / 5.5 ng/mL  CARDIAC MARKERS ( 03 Nov 2019 05:45 )  .036 ng/mL / x     / x     / x     / x      CARDIAC MARKERS ( 02 Nov 2019 22:37 )  <.015 ng/mL / x     / 502 U/L / x     / x             ECG:  < from: 12 Lead ECG (11.02.19 @ 23:45) >    Ventricular Rate 90 BPM    Atrial Rate 90 BPM    P-R Interval 166 ms    QRS Duration 78 ms    Q-T Interval 356 ms    QTC Calculation(Bezet) 435 ms    P Axis 59 degrees    R Axis -17 degrees    T Axis 48 degrees    Diagnosis Line Normal sinus rhythm  When compared with ECG of 02-NOV-2019 22:17, (Unconfirmed)  No significant change was found    < end of copied text >          Radiology:  < from: Xray Chest 2 Views PA/Lat (11.02.19 @ 22:56) >  Aortic knob contains calcifications. Clear lungs. No sign of lobar   consolidation vascular congestion or effusion. Heart size within normal   limits. Hilar regions, mediastinal contours intact. No acute osseous   abnormalities.    < end of copied text >              Cardiology [facility] -- Adriana Be, Heidi, Inga, Keegan, Aracelis Conklin  Office # [phone]      Follow Up:  chest pain    Subjective/Observations:   Seen at bedside, reports this am 0500 had left sided chest pain radiating to left side neck now resolved,  denies associated symptoms of dizziness diaphoresis nausea  currently with no complaints    REVIEW OF SYSTEMS: All other review of systems is negative unless indicated above    PAST MEDICAL & SURGICAL HISTORY:  Stented coronary artery  H/O hernia repair      MEDICATIONS  (STANDING):  aspirin 325 milliGRAM(s) Oral daily  atorvastatin 40 milliGRAM(s) Oral at bedtime  cholecalciferol 1000 Unit(s) Oral daily  clopidogrel Tablet 75 milliGRAM(s) Oral daily  enoxaparin Injectable 80 milliGRAM(s) SubCutaneous two times a day  lisinopril 10 milliGRAM(s) Oral daily    MEDICATIONS  (PRN):      Allergies    Novocain (Other)    Intolerances        Vital Signs Last 24 Hrs  T(C): 36.8 (04 Nov 2019 07:58), Max: 37 (04 Nov 2019 04:56)  T(F): 98.2 (04 Nov 2019 07:58), Max: 98.6 (04 Nov 2019 04:56)  HR: 53 (04 Nov 2019 07:58) (50 - 63)  BP: 137/81 (04 Nov 2019 07:58) (137/81 - 180/100)  BP(mean): --  RR: 16 (04 Nov 2019 07:58) (15 - 18)  SpO2: 95% (04 Nov 2019 07:58) (95% - 98%)    I&O's Summary        PHYSICAL EXAM:  TELE: sb 50bpm  Constitutional: NAD, awake and alert, well-developed  HEENT: Moist Mucous Membranes, Anicteric  Pulmonary: Decreased breath sounds b/l. No rales, crackles or wheeze appreciated.   Cardiovascular: [place], S1 and S2 nl, No murmurs, rubs, gallops or clicks  Gastrointestinal: Bowel Sounds present, soft, nontender.   Lymph: No lymphadenopathy. No peripheral edema.  Skin: No visible rashes or ulcers.  Psych:  Mood & affect appropriate    LABS: All Labs Reviewed:                        15.5   6.05  )-----------( 202      ( 04 Nov 2019 05:42 )             47.1                         15.6   7.47  )-----------( 212      ( 02 Nov 2019 22:37 )             46.6     04 Nov 2019 05:42    142    |  109    |  17     ----------------------------<  116    4.2     |  27     |  0.85   02 Nov 2019 22:37    144    |  110    |  21     ----------------------------<  141    3.5     |  26     |  0.99     Ca    8.5        04 Nov 2019 05:42  Ca    9.2        02 Nov 2019 22:37    TPro  7.4    /  Alb  4.2    /  TBili  0.4    /  DBili  x      /  AST  28     /  ALT  32     /  AlkPhos  112    02 Nov 2019 22:37    PT/INR - ( 02 Nov 2019 22:37 )   PT: 10.9 sec;   INR: 0.97 ratio         PTT - ( 02 Nov 2019 22:37 )  PTT:28.6 sec  CARDIAC MARKERS ( 04 Nov 2019 05:42 )  <.015 ng/mL / x     / 179 U/L / x     / 3.2 ng/mL  CARDIAC MARKERS ( 03 Nov 2019 11:47 )  <.015 ng/mL / x     / 270 U/L / x     / 5.5 ng/mL  CARDIAC MARKERS ( 03 Nov 2019 05:45 )  .036 ng/mL / x     / x     / x     / x      CARDIAC MARKERS ( 02 Nov 2019 22:37 )  <.015 ng/mL / x     / 502 U/L / x     / x             ECG:  < from: 12 Lead ECG (11.02.19 @ 23:45) >    Ventricular Rate 90 BPM    Atrial Rate 90 BPM    P-R Interval 166 ms    QRS Duration 78 ms    Q-T Interval 356 ms    QTC Calculation(Bezet) 435 ms    P Axis 59 degrees    R Axis -17 degrees    T Axis 48 degrees    Diagnosis Line Normal sinus rhythm  When compared with ECG of 02-NOV-2019 22:17, (Unconfirmed)  No significant change was found    < end of copied text >          Radiology:  < from: Xray Chest 2 Views PA/Lat (11.02.19 @ 22:56) >  Aortic knob contains calcifications. Clear lungs. No sign of lobar   consolidation vascular congestion or effusion. Heart size within normal   limits. Hilar regions, mediastinal contours intact. No acute osseous   abnormalities.    < end of copied text >              Cardiology

## 2019-11-04 NOTE — DISCHARGE NOTE PROVIDER - CARE PROVIDERS DIRECT ADDRESSES
[email or web address]

## 2019-11-04 NOTE — DISCHARGE NOTE PROVIDER - HOSPITAL COURSE
88 y/o M with a PMHx of HTN, HLD, CAD s/p MI and PCI in 2005 presented to the ED c/o palpitations with associated throat discomfort admitted with unstable angina. Ddimer was mildly elevated, CTA was done that was negative for pulmonary embolism, CXR was grossly clear. Cardiology consult (Dr. Xiong) followed patient during hospital stay, EKG was not acutely ischemic, cardiac enzymes were trended that were wnl. Patient was continued on home statin, ASA, Plavix and started on full dose Lovenox.          On day of discharge patient stable for discharge to [place] for cath.          ICU Vital Signs Last 24 Hrs    T(C): 36.8 (04 Nov 2019 07:58), Max: 37 (04 Nov 2019 04:56)    T(F): 98.2 (04 Nov 2019 07:58), Max: 98.6 (04 Nov 2019 04:56)    HR: 53 (04 Nov 2019 07:58) (50 - 63)    BP: 137/81 (04 Nov 2019 07:58) (137/81 - 180/100)    BP(mean): --    ABP: --    ABP(mean): --    RR: 16 (04 Nov 2019 07:58) (14 - 18)    SpO2: 95% (04 Nov 2019 07:58) (95% - 98%) 89 y/o M with a PMHx of HTN, HLD, CAD s/p MI and PCI in 2005 presented to the ED c/o palpitations with associated throat discomfort admitted with unstable angina. Ddimer was mildly elevated, CTA was done that was negative for pulmonary embolism, CXR was grossly clear. Cardiology consult (Dr. Xiong) followed patient during hospital stay, EKG was not acutely ischemic, cardiac enzymes were trended that were wnl. Patient was continued on home statin, ASA, Plavix and started on full dose Lovenox.          On day of discharge patient stable for discharge to [place] for cath.          ICU Vital Signs Last 24 Hrs    T(C): 36.8 (04 Nov 2019 07:58), Max: 37 (04 Nov 2019 04:56)    T(F): 98.2 (04 Nov 2019 07:58), Max: 98.6 (04 Nov 2019 04:56)    HR: 53 (04 Nov 2019 07:58) (50 - 63)    BP: 137/81 (04 Nov 2019 07:58) (137/81 - 180/100)    RR: 16 (04 Nov 2019 07:58) (14 - 18)    SpO2: 95% (04 Nov 2019 07:58) (95% - 98%)        PHYSICAL EXAM:    GENERAL: NAD, lying in bed comfortably    HEAD:  Atraumatic, Normocephalic    EYES: EOMI, PERRLA, conjunctiva and sclera clear    ENT: Moist mucous membranes    NECK: Supple, No JVD    CHEST/LUNG: Clear to auscultation bilaterally; No rales, rhonchi, wheezing, or rubs. Unlabored respirations    HEART: Regular rate and rhythm; No murmurs, rubs, or gallops    ABDOMEN: Bowel sounds present; Soft, Nontender, Nondistended. No hepatomegally    EXTREMITIES:  2+ Peripheral Pulses, brisk capillary refill. No clubbing, cyanosis, or edema    NERVOUS SYSTEM:  Alert & Oriented X3, speech clear. No deficits     MSK: FROM all 4 extremities, full and equal strength    SKIN: No rashes or lesions        Patient is medically optimized for transfer to [place] for cath. 70 y/o M with a PMHx of HTN, HLD, CAD s/p MI and PCI in 2005 presented to the ED c/o palpitations with associated throat discomfort admitted with unstable angina. Ddimer was mildly elevated, CTA was done that was negative for pulmonary embolism or PNA. Cardiology consult (Dr. Inga bowen) followed patient during hospital stay, EKG was not acutely ischemic, cardiac enzymes were trended that were wnl. Patient was continued on home statin, ASA, Plavix and started on full dose Lovenox as per cardio recs.         Patient stable for discharge to [place] for cardiac cath.        On day of discharge:    ROS: denies CP, palpitations. Neck discomfort resolved while at rest. Denies fever, chills, cough.     Vital Signs Last 24 Hrs    T(C): 36.8 (04 Nov 2019 07:58), Max: 37 (04 Nov 2019 04:56)    T(F): 98.2 (04 Nov 2019 07:58), Max: 98.6 (04 Nov 2019 04:56)    HR: 53 (04 Nov 2019 07:58) (50 - 63)    BP: 137/81 (04 Nov 2019 07:58) (137/81 - 180/100)    RR: 16 (04 Nov 2019 07:58) (14 - 18)    SpO2: 95% (04 Nov 2019 07:58) (95% - 98%)        PHYSICAL EXAM:    GENERAL: NAD, lying in bed comfortably    HEENT: mmm, anicteric     NECK: Supple, No JVD    CHEST/LUNG: Clear to auscultation bilaterally; No rales, rhonchi, wheezing, or rubs. Unlabored respirations    HEART: Regular rate and rhythm, S1, S2    ABDOMEN: Bowel sounds present; Soft, Nontender, Nondistended.     EXTREMITIES: No clubbing, cyanosis, or edema    NERVOUS SYSTEM:  Alert & Oriented X3, speech clear.        Time spent: 45min

## 2019-11-04 NOTE — PROGRESS NOTE ADULT - ASSESSMENT
69male pmg htn hyperlipidemia, known cad s/p mi and pci in 2005 seen by Dr. Moreno in [place] presenting with left sided chest and neck discomfort.  A cta was performed given sxs and elevated ddimer which was negative for pe.  coronary calcification was noted.  Consultation is now being obtained.    CAD sp PCI 2005  -Continue with ASa Statin and Plavix  -Plan is for transfer for cath will discuss with Dr Moreno - patients primary cardiologist   - on FD lovenox   -Euvolemic one exam     HTN  -Continue with lisinopril, stable 137/81  -Monitor and replete electrolytes. Keep K>4.0 and Mg>2.0.    HLD   -Continue with statin     Check hgbA1c- elevated glucose on renal profile noted     Brigette Marie FNP-C  Cardiology NP  [phone]

## 2019-11-04 NOTE — DISCHARGE NOTE NURSING/CASE MANAGEMENT/SOCIAL WORK - NSDCPEEMAIL_GEN_ALL_CORE
[facility] for Tobacco Control email [email or web address]

## 2019-11-04 NOTE — DISCHARGE NOTE PROVIDER - NSDCCPCAREPLAN_GEN_ALL_CORE_FT
PRINCIPAL DISCHARGE DIAGNOSIS  Diagnosis: Chest pain  Assessment and Plan of Treatment: -You were admitted for unstable angina   -Cardiac ensymes were trended that were within normal limits   -While at [place] you were continued on home statin, ASA, plavix and started on full dose Lovenox   - You were transferred to [place] for a cath since your cardiologist is at [place]   -Continue to follow with your cardiologist and PCP after discharge      SECONDARY DISCHARGE DIAGNOSES  Diagnosis: HTN (hypertension)  Assessment and Plan of Treatment: -Continue home quinipril    Diagnosis: HLD (hyperlipidemia)  Assessment and Plan of Treatment: -Continue home statin PRINCIPAL DISCHARGE DIAGNOSIS  Diagnosis: Chest pain  Assessment and Plan of Treatment: -You were admitted for unstable angina   -Cardiac enzymes were trended and were within normal limits signifying you did not have a heart attack.   -Cardiology evaluated you and recommended aspirin, plavix and full dose Lovenox for now prior to transfer.   - You were transferred to [place] for a cardiac cath to further evaluated for symptomatic coronary artery disease.  -Continue to follow with your cardiologist and PCP after discharge      SECONDARY DISCHARGE DIAGNOSES  Diagnosis: HLD (hyperlipidemia)  Assessment and Plan of Treatment: -Continue lipitor    Diagnosis: HTN (hypertension)  Assessment and Plan of Treatment: -Continue home regimen and follow up with your PMD.

## 2019-11-04 NOTE — DISCHARGE NOTE PROVIDER - NSDCMRMEDTOKEN_GEN_ALL_CORE_FT
aspirin 325 mg oral tablet: 1 tab(s) orally once a day  atorvastatin 40 mg oral tablet: 1 tab(s) orally once a day  enoxaparin: 80 milligram(s) subcutaneous 2 times a day  Plavix 75 mg oral tablet: 1 tab(s) orally once a day  quinapril 10 mg oral tablet: 1 tab(s) orally once a day  Vitamin D3 1000 intl units oral tablet: 1 tab(s) orally once a day

## 2019-11-04 NOTE — DISCHARGE NOTE NURSING/CASE MANAGEMENT/SOCIAL WORK - NSDCPEWEB_GEN_ALL_CORE
[place] website --- [email or web address] Tobacco [email or web address] website --- [email or web address]

## 2019-11-04 NOTE — DISCHARGE NOTE NURSING/CASE MANAGEMENT/SOCIAL WORK - PATIENT PORTAL LINK FT
You can access the FollowMyHealth Patient Portal offered by [facility] by registering at the following website: [email or web address]. By joining [employer]’s FollowMyHealth portal, you will also be able to view your health information using other applications (apps) compatible with our system.

## 2019-11-04 NOTE — DISCHARGE NOTE PROVIDER - CARE PROVIDER_API CALL
Seth Moreno)  Cardiovascular Disease; Internal Medicine  [address] [place] [address]  Phone: [phone]  Fax: [phone]  Follow Up Time:     Sanchez Grider)  [facility]  [address] [place] [address]  Phone: [phone]  Fax: [phone]  Follow Up Time:

## 2019-11-12 PROCEDURE — 82550 ASSAY OF CK (CPK): CPT

## 2019-11-12 PROCEDURE — 85610 PROTHROMBIN TIME: CPT

## 2019-11-12 PROCEDURE — 36415 COLL VENOUS BLD VENIPUNCTURE: CPT

## 2019-11-12 PROCEDURE — 93005 ELECTROCARDIOGRAM TRACING: CPT

## 2019-11-12 PROCEDURE — 86803 HEPATITIS C AB TEST: CPT

## 2019-11-12 PROCEDURE — 71046 X-RAY EXAM CHEST 2 VIEWS: CPT

## 2019-11-12 PROCEDURE — 96374 THER/PROPH/DIAG INJ IV PUSH: CPT

## 2019-11-12 PROCEDURE — 82553 CREATINE MB FRACTION: CPT

## 2019-11-12 PROCEDURE — 85730 THROMBOPLASTIN TIME PARTIAL: CPT

## 2019-11-12 PROCEDURE — 80048 BASIC METABOLIC PNL TOTAL CA: CPT

## 2019-11-12 PROCEDURE — 99285 EMERGENCY DEPT VISIT HI MDM: CPT | Mod: 25

## 2019-11-12 PROCEDURE — 85379 FIBRIN DEGRADATION QUANT: CPT

## 2019-11-12 PROCEDURE — 85027 COMPLETE CBC AUTOMATED: CPT

## 2019-11-12 PROCEDURE — 80053 COMPREHEN METABOLIC PANEL: CPT

## 2019-11-12 PROCEDURE — 84484 ASSAY OF TROPONIN QUANT: CPT

## 2019-11-12 PROCEDURE — 71275 CT ANGIOGRAPHY CHEST: CPT

## 2021-07-29 NOTE — PATIENT PROFILE ADULT - NSPROSPHOSPCHAPLAINYN_GEN_A_NUR
"Chief Complaint   Patient presents with     Video Visit     RUQ abd pain        Vitals:    07/29/21 1525   Weight: 165 lb   Height: 5' 5\"       Body mass index is 27.46 kg/m .    Juana Greenfield CMA    " no

## 2021-09-05 ENCOUNTER — TRANSCRIPTION ENCOUNTER (OUTPATIENT)
Age: 71
End: 2021-09-05

## 2021-10-03 ENCOUNTER — TRANSCRIPTION ENCOUNTER (OUTPATIENT)
Age: 71
End: 2021-10-03

## 2022-05-05 ENCOUNTER — NON-APPOINTMENT (OUTPATIENT)
Age: 72
End: 2022-05-05

## 2023-05-26 NOTE — PROVIDER CONTACT NOTE (OTHER) - DATE AND TIME:
04-Nov-2019 04:45 Topical Metronidazole Pregnancy And Lactation Text: This medication is Pregnancy Category B and considered safe during pregnancy.  It is also considered safe to use while breastfeeding.

## 2023-08-28 ENCOUNTER — NON-APPOINTMENT (OUTPATIENT)
Age: 73
End: 2023-08-28

## 2023-12-14 NOTE — ED ADULT NURSE NOTE - INTEGUMENTARY WDL
Patient was transferred to clerical pool to confirm an appointment for tomorrow. Patient denies fever. Patient has been on antibiotic Bactrim. Color consistent with ethnicity/race, warm, dry intact, resilient.

## 2024-02-04 ENCOUNTER — NON-APPOINTMENT (OUTPATIENT)
Age: 74
End: 2024-02-04

## 2024-03-06 ENCOUNTER — NON-APPOINTMENT (OUTPATIENT)
Age: 74
End: 2024-03-06

## 2024-07-10 ENCOUNTER — NON-APPOINTMENT (OUTPATIENT)
Age: 74
End: 2024-07-10

## 2024-09-29 ENCOUNTER — NON-APPOINTMENT (OUTPATIENT)
Age: 74
End: 2024-09-29

## 2024-10-19 ENCOUNTER — NON-APPOINTMENT (OUTPATIENT)
Age: 74
End: 2024-10-19

## 2024-10-23 ENCOUNTER — NON-APPOINTMENT (OUTPATIENT)
Age: 74
End: 2024-10-23

## 2024-10-28 ENCOUNTER — APPOINTMENT (OUTPATIENT)
Dept: ORTHOPEDIC SURGERY | Facility: CLINIC | Age: 74
End: 2024-10-28
Payer: MEDICARE

## 2024-10-28 VITALS — HEIGHT: 72 IN

## 2024-10-28 PROBLEM — M43.16 SPONDYLOLISTHESIS OF LUMBAR REGION: Status: ACTIVE | Noted: 2024-10-28

## 2024-10-28 PROBLEM — Z95.5 PRESENCE OF CORONARY ANGIOPLASTY IMPLANT AND GRAFT: Chronic | Status: ACTIVE | Noted: 2019-11-02

## 2024-10-28 PROBLEM — M51.362 DEGENERATION OF INTERVERTEBRAL DISC OF LUMBAR REGION WITH DISCOGENIC BACK PAIN AND LOWER EXTREMITY PAIN: Status: ACTIVE | Noted: 2024-10-28

## 2024-10-28 PROBLEM — M48.061 LUMBAR FORAMINAL STENOSIS: Status: ACTIVE | Noted: 2024-10-28

## 2024-10-28 PROCEDURE — 73502 X-RAY EXAM HIP UNI 2-3 VIEWS: CPT

## 2024-10-28 PROCEDURE — 72100 X-RAY EXAM L-S SPINE 2/3 VWS: CPT

## 2024-10-28 PROCEDURE — 99204 OFFICE O/P NEW MOD 45 MIN: CPT

## 2024-10-28 RX ORDER — LISINOPRIL 40 MG/1
40 TABLET ORAL
Refills: 0 | Status: ACTIVE | COMMUNITY

## 2024-10-28 RX ORDER — TOBRAMYCIN 0.3 %
DROPS OPHTHALMIC (EYE)
Refills: 0 | Status: ACTIVE | COMMUNITY

## 2024-10-28 RX ORDER — OMEPRAZOLE 40 MG/1
40 CAPSULE, DELAYED RELEASE ORAL
Refills: 0 | Status: ACTIVE | COMMUNITY

## 2024-10-28 RX ORDER — MELOXICAM 15 MG/1
15 TABLET ORAL
Qty: 30 | Refills: 1 | Status: ACTIVE | COMMUNITY
Start: 2024-10-28 | End: 1900-01-01

## 2024-10-28 RX ORDER — CRANBERRY FRUIT EXTRACT 650 MG
CAPSULE ORAL
Refills: 0 | Status: ACTIVE | COMMUNITY

## 2024-10-28 RX ORDER — ASPIRIN 325 MG
TABLET ORAL
Refills: 0 | Status: ACTIVE | COMMUNITY

## 2024-10-28 RX ORDER — METHYLPREDNISOLONE 4 MG/1
4 TABLET ORAL
Qty: 1 | Refills: 0 | Status: ACTIVE | COMMUNITY
Start: 2024-10-28 | End: 1900-01-01

## 2024-10-28 RX ORDER — AMIODARONE HYDROCHLORIDE 400 MG/1
TABLET ORAL
Refills: 0 | Status: ACTIVE | COMMUNITY

## 2024-10-28 RX ORDER — ATORVASTATIN CALCIUM 40 MG/1
40 TABLET, FILM COATED ORAL
Refills: 0 | Status: ACTIVE | COMMUNITY

## 2024-10-29 ENCOUNTER — APPOINTMENT (OUTPATIENT)
Dept: MRI IMAGING | Facility: CLINIC | Age: 74
End: 2024-10-29
Payer: MEDICARE

## 2024-10-29 PROCEDURE — 72148 MRI LUMBAR SPINE W/O DYE: CPT

## 2024-11-01 ENCOUNTER — NON-APPOINTMENT (OUTPATIENT)
Age: 74
End: 2024-11-01

## 2024-11-07 ENCOUNTER — APPOINTMENT (OUTPATIENT)
Dept: ORTHOPEDIC SURGERY | Facility: CLINIC | Age: 74
End: 2024-11-07
Payer: MEDICARE

## 2024-11-07 DIAGNOSIS — M43.16 SPONDYLOLISTHESIS, LUMBAR REGION: ICD-10-CM

## 2024-11-07 DIAGNOSIS — M51.362: ICD-10-CM

## 2024-11-07 DIAGNOSIS — M71.38 OTHER BURSAL CYST, OTHER SITE: ICD-10-CM

## 2024-11-07 PROBLEM — M54.16 LUMBAR RADICULOPATHY, ACUTE: Status: ACTIVE | Noted: 2024-11-07

## 2024-11-07 PROCEDURE — 99213 OFFICE O/P EST LOW 20 MIN: CPT

## 2024-11-13 ENCOUNTER — APPOINTMENT (OUTPATIENT)
Dept: PAIN MANAGEMENT | Facility: CLINIC | Age: 74
End: 2024-11-13

## 2024-11-14 ENCOUNTER — APPOINTMENT (OUTPATIENT)
Dept: PAIN MANAGEMENT | Facility: CLINIC | Age: 74
End: 2024-11-14
Payer: MEDICARE

## 2024-11-14 DIAGNOSIS — Z86.79 PERSONAL HISTORY OF OTHER DISEASES OF THE CIRCULATORY SYSTEM: ICD-10-CM

## 2024-11-14 DIAGNOSIS — M48.061 SPINAL STENOSIS, LUMBAR REGION WITHOUT NEUROGENIC CLAUDICATION: ICD-10-CM

## 2024-11-14 PROCEDURE — 99204 OFFICE O/P NEW MOD 45 MIN: CPT

## 2024-11-14 RX ORDER — GABAPENTIN 300 MG/1
300 CAPSULE ORAL
Qty: 30 | Refills: 0 | Status: ACTIVE | COMMUNITY
Start: 2024-11-14 | End: 1900-01-01

## 2024-11-14 RX ORDER — TRAMADOL HYDROCHLORIDE 50 MG/1
50 TABLET, COATED ORAL 3 TIMES DAILY
Qty: 21 | Refills: 0 | Status: ACTIVE | COMMUNITY
Start: 2024-11-14 | End: 1900-01-01

## 2024-11-20 ENCOUNTER — APPOINTMENT (OUTPATIENT)
Dept: PAIN MANAGEMENT | Facility: CLINIC | Age: 74
End: 2024-11-20
Payer: MEDICARE

## 2024-11-20 DIAGNOSIS — M54.16 RADICULOPATHY, LUMBAR REGION: ICD-10-CM

## 2024-11-20 PROCEDURE — 64483 NJX AA&/STRD TFRM EPI L/S 1: CPT | Mod: RT

## 2024-11-20 PROCEDURE — 64484 NJX AA&/STRD TFRM EPI L/S EA: CPT | Mod: 59,RT

## 2024-12-03 ENCOUNTER — NON-APPOINTMENT (OUTPATIENT)
Age: 74
End: 2024-12-03

## 2024-12-05 ENCOUNTER — APPOINTMENT (OUTPATIENT)
Dept: ORTHOPEDIC SURGERY | Facility: CLINIC | Age: 74
End: 2024-12-05

## 2024-12-12 ENCOUNTER — APPOINTMENT (OUTPATIENT)
Dept: PAIN MANAGEMENT | Facility: CLINIC | Age: 74
End: 2024-12-12
Payer: MEDICARE

## 2024-12-12 VITALS — BODY MASS INDEX: 22.75 KG/M2 | WEIGHT: 168 LBS | HEIGHT: 72 IN

## 2024-12-12 DIAGNOSIS — M48.061 SPINAL STENOSIS, LUMBAR REGION WITHOUT NEUROGENIC CLAUDICATION: ICD-10-CM

## 2024-12-12 PROCEDURE — 99214 OFFICE O/P EST MOD 30 MIN: CPT

## 2024-12-14 ENCOUNTER — NON-APPOINTMENT (OUTPATIENT)
Age: 74
End: 2024-12-14

## 2024-12-18 ENCOUNTER — EMERGENCY (EMERGENCY)
Facility: HOSPITAL | Age: 74
LOS: 1 days | Discharge: HOME | End: 2024-12-18
Attending: STUDENT IN AN ORGANIZED HEALTH CARE EDUCATION/TRAINING PROGRAM | Admitting: STUDENT IN AN ORGANIZED HEALTH CARE EDUCATION/TRAINING PROGRAM
Payer: MEDICARE

## 2024-12-18 VITALS
SYSTOLIC BLOOD PRESSURE: 126 MMHG | OXYGEN SATURATION: 96 % | TEMPERATURE: 98 F | DIASTOLIC BLOOD PRESSURE: 74 MMHG | RESPIRATION RATE: 20 BRPM

## 2024-12-18 VITALS
WEIGHT: 169.09 LBS | SYSTOLIC BLOOD PRESSURE: 168 MMHG | DIASTOLIC BLOOD PRESSURE: 78 MMHG | RESPIRATION RATE: 19 BRPM | TEMPERATURE: 99 F | OXYGEN SATURATION: 62 % | HEART RATE: 66 BPM

## 2024-12-18 DIAGNOSIS — Z98.890 OTHER SPECIFIED POSTPROCEDURAL STATES: Chronic | ICD-10-CM

## 2024-12-18 LAB
APPEARANCE UR: CLEAR — SIGNIFICANT CHANGE UP
BASOPHILS # BLD AUTO: 0.01 K/UL — SIGNIFICANT CHANGE UP (ref 0–0.2)
BASOPHILS NFR BLD AUTO: 0.2 % — SIGNIFICANT CHANGE UP (ref 0–2)
BILIRUB UR-MCNC: NEGATIVE — SIGNIFICANT CHANGE UP
COLOR SPEC: ABNORMAL
DIFF PNL FLD: ABNORMAL
EOSINOPHIL # BLD AUTO: 0.14 K/UL — SIGNIFICANT CHANGE UP (ref 0–0.5)
EOSINOPHIL NFR BLD AUTO: 2.1 % — SIGNIFICANT CHANGE UP (ref 0–6)
GLUCOSE UR QL: NEGATIVE MG/DL — SIGNIFICANT CHANGE UP
HCT VFR BLD CALC: 39.8 % — SIGNIFICANT CHANGE UP (ref 39–50)
HGB BLD-MCNC: 13.2 G/DL — SIGNIFICANT CHANGE UP (ref 13–17)
IMM GRANULOCYTES NFR BLD AUTO: 0.3 % — SIGNIFICANT CHANGE UP (ref 0–0.9)
KETONES UR-MCNC: NEGATIVE MG/DL — SIGNIFICANT CHANGE UP
LEUKOCYTE ESTERASE UR-ACNC: ABNORMAL
LYMPHOCYTES # BLD AUTO: 1.1 K/UL — SIGNIFICANT CHANGE UP (ref 1–3.3)
LYMPHOCYTES # BLD AUTO: 16.8 % — SIGNIFICANT CHANGE UP (ref 13–44)
MCHC RBC-ENTMCNC: 29.3 PG — SIGNIFICANT CHANGE UP (ref 27–34)
MCHC RBC-ENTMCNC: 33.2 G/DL — SIGNIFICANT CHANGE UP (ref 32–36)
MCV RBC AUTO: 88.4 FL — SIGNIFICANT CHANGE UP (ref 80–100)
MONOCYTES # BLD AUTO: 0.51 K/UL — SIGNIFICANT CHANGE UP (ref 0–0.9)
MONOCYTES NFR BLD AUTO: 7.8 % — SIGNIFICANT CHANGE UP (ref 2–14)
NEUTROPHILS # BLD AUTO: 4.78 K/UL — SIGNIFICANT CHANGE UP (ref 1.8–7.4)
NEUTROPHILS NFR BLD AUTO: 72.8 % — SIGNIFICANT CHANGE UP (ref 43–77)
NITRITE UR-MCNC: NEGATIVE — SIGNIFICANT CHANGE UP
NRBC # BLD: 0 /100 WBCS — SIGNIFICANT CHANGE UP (ref 0–0)
NRBC BLD-RTO: 0 /100 WBCS — SIGNIFICANT CHANGE UP (ref 0–0)
PH UR: 6.5 — SIGNIFICANT CHANGE UP (ref 5–8)
PLATELET # BLD AUTO: 193 K/UL — SIGNIFICANT CHANGE UP (ref 150–400)
PROT UR-MCNC: 30 MG/DL
RBC # BLD: 4.5 M/UL — SIGNIFICANT CHANGE UP (ref 4.2–5.8)
RBC # FLD: 14.7 % — HIGH (ref 10.3–14.5)
SP GR SPEC: 1 — SIGNIFICANT CHANGE UP (ref 1–1.03)
UROBILINOGEN FLD QL: 0.2 MG/DL — SIGNIFICANT CHANGE UP (ref 0.2–1)
WBC # BLD: 6.56 K/UL — SIGNIFICANT CHANGE UP (ref 3.8–10.5)
WBC # FLD AUTO: 6.56 K/UL — SIGNIFICANT CHANGE UP (ref 3.8–10.5)

## 2024-12-18 PROCEDURE — 99284 EMERGENCY DEPT VISIT MOD MDM: CPT | Mod: 25

## 2024-12-18 PROCEDURE — 87086 URINE CULTURE/COLONY COUNT: CPT

## 2024-12-18 PROCEDURE — 99284 EMERGENCY DEPT VISIT MOD MDM: CPT

## 2024-12-18 PROCEDURE — 87186 SC STD MICRODIL/AGAR DIL: CPT

## 2024-12-18 PROCEDURE — 74176 CT ABD & PELVIS W/O CONTRAST: CPT | Mod: MC

## 2024-12-18 PROCEDURE — 85025 COMPLETE CBC W/AUTO DIFF WBC: CPT

## 2024-12-18 PROCEDURE — 80053 COMPREHEN METABOLIC PANEL: CPT

## 2024-12-18 PROCEDURE — 81001 URINALYSIS AUTO W/SCOPE: CPT

## 2024-12-18 PROCEDURE — 36415 COLL VENOUS BLD VENIPUNCTURE: CPT

## 2024-12-18 PROCEDURE — 74176 CT ABD & PELVIS W/O CONTRAST: CPT | Mod: 26,MC

## 2024-12-18 RX ORDER — CEFPODOXIME PROXETIL 200 MG/1
200 TABLET, FILM COATED ORAL ONCE
Refills: 0 | Status: COMPLETED | OUTPATIENT
Start: 2024-12-18 | End: 2024-12-18

## 2024-12-19 RX ORDER — CEFPODOXIME PROXETIL 200 MG/1
1 TABLET, FILM COATED ORAL
Qty: 14 | Refills: 0
Start: 2024-12-19 | End: 2024-12-25

## 2025-01-29 ENCOUNTER — APPOINTMENT (OUTPATIENT)
Dept: PAIN MANAGEMENT | Facility: CLINIC | Age: 75
End: 2025-01-29
Payer: MEDICARE

## 2025-01-29 DIAGNOSIS — M47.816 SPONDYLOSIS W/OUT MYELOPATHY OR RADICULOPATHY, LUMBAR REGION: ICD-10-CM

## 2025-01-29 PROCEDURE — 64495 INJ PARAVERT F JNT L/S 3 LEV: CPT | Mod: 59,RT

## 2025-01-29 PROCEDURE — 64494 INJ PARAVERT F JNT L/S 2 LEV: CPT | Mod: 59,RT

## 2025-01-29 PROCEDURE — 64493 INJ PARAVERT F JNT L/S 1 LEV: CPT | Mod: RT

## 2025-02-11 ENCOUNTER — APPOINTMENT (OUTPATIENT)
Dept: PAIN MANAGEMENT | Facility: CLINIC | Age: 75
End: 2025-02-11
Payer: MEDICARE

## 2025-02-11 VITALS — WEIGHT: 173 LBS | BODY MASS INDEX: 23.43 KG/M2 | HEIGHT: 72 IN

## 2025-02-11 DIAGNOSIS — M47.816 SPONDYLOSIS W/OUT MYELOPATHY OR RADICULOPATHY, LUMBAR REGION: ICD-10-CM

## 2025-02-11 PROCEDURE — 99214 OFFICE O/P EST MOD 30 MIN: CPT

## 2025-02-17 ENCOUNTER — NON-APPOINTMENT (OUTPATIENT)
Age: 75
End: 2025-02-17

## 2025-05-14 ENCOUNTER — APPOINTMENT (OUTPATIENT)
Dept: PAIN MANAGEMENT | Facility: CLINIC | Age: 75
End: 2025-05-14
Payer: MEDICARE

## 2025-05-14 VITALS — HEIGHT: 68 IN | WEIGHT: 179 LBS | BODY MASS INDEX: 27.13 KG/M2

## 2025-05-14 DIAGNOSIS — M54.16 RADICULOPATHY, LUMBAR REGION: ICD-10-CM

## 2025-05-14 DIAGNOSIS — M47.816 SPONDYLOSIS W/OUT MYELOPATHY OR RADICULOPATHY, LUMBAR REGION: ICD-10-CM

## 2025-05-14 PROCEDURE — 99213 OFFICE O/P EST LOW 20 MIN: CPT

## 2025-08-19 ENCOUNTER — NON-APPOINTMENT (OUTPATIENT)
Age: 75
End: 2025-08-19